# Patient Record
Sex: FEMALE | Race: WHITE | Employment: FULL TIME | URBAN - METROPOLITAN AREA
[De-identification: names, ages, dates, MRNs, and addresses within clinical notes are randomized per-mention and may not be internally consistent; named-entity substitution may affect disease eponyms.]

---

## 2020-08-19 ENCOUNTER — HOSPITAL ENCOUNTER (OUTPATIENT)
Facility: CLINIC | Age: 35
End: 2020-08-19
Payer: COMMERCIAL

## 2020-10-29 ENCOUNTER — OFFICE VISIT (OUTPATIENT)
Dept: SURGERY | Facility: CLINIC | Age: 35
End: 2020-10-29
Payer: COMMERCIAL

## 2020-10-29 VITALS
BODY MASS INDEX: 27 KG/M2 | WEIGHT: 168 LBS | DIASTOLIC BLOOD PRESSURE: 82 MMHG | HEART RATE: 80 BPM | SYSTOLIC BLOOD PRESSURE: 114 MMHG | RESPIRATION RATE: 12 BRPM | HEIGHT: 66 IN | OXYGEN SATURATION: 100 %

## 2020-10-29 DIAGNOSIS — K42.9 UMBILICAL HERNIA WITHOUT OBSTRUCTION AND WITHOUT GANGRENE: ICD-10-CM

## 2020-10-29 PROCEDURE — 99203 OFFICE O/P NEW LOW 30 MIN: CPT | Performed by: SURGERY

## 2020-10-29 SDOH — HEALTH STABILITY: MENTAL HEALTH: HOW OFTEN DO YOU HAVE 6 OR MORE DRINKS ON ONE OCCASION?: NOT ASKED

## 2020-10-29 SDOH — HEALTH STABILITY: MENTAL HEALTH: HOW MANY STANDARD DRINKS CONTAINING ALCOHOL DO YOU HAVE ON A TYPICAL DAY?: NOT ASKED

## 2020-10-29 SDOH — HEALTH STABILITY: MENTAL HEALTH: HOW OFTEN DO YOU HAVE A DRINK CONTAINING ALCOHOL?: NOT ASKED

## 2020-10-29 ASSESSMENT — MIFFLIN-ST. JEOR: SCORE: 1460.85

## 2020-10-29 NOTE — LETTER
"2020       Re: Aisha Cantu - 1985    Surgery Consultation, [facility]         Bandar Boyd MD, MD     Aisha Cantu MRN# [id number]   YOB: 1985 Age: 35 year old      PCP:  Elif Porter [phone]     Chief Complaint:  Umbilical hernia     History of Present Illness:  Aisha Cantu is a 35 year old female who presented with a bulge near the umbilicus. The bulge comes and goes but has gotten larger over time. It is uncomfortable when the patient is engaging in exertional activity.  She first noticed it when she was pregnant with her second child, whom she delivered 6 weeks ago.  She has had relative improvement in her abdominal wall musculature and has no significant pain.  She has noticed a diastases after delivering her child but this is slowly improving.  No signs or symptoms of bowel obstruction.  The patient is here to discuss possible surgical repair of the umbilical hernia.     PMH:  Aisha Cantu  has no past medical history on file.  PSH:  Aisha Cantu  has no past surgical history on file.     Home medications and allergies reviewed.     Social History:  Aisha Cantu  reports that she has never smoked. She has never used smokeless tobacco. She reports current alcohol use.  Family History:  Aisha Cantu family history includes Breast Cancer in her paternal grandmother.     ROS:  The 10 point Review of Systems is negative other than noted in the HPI.     Physical Exam:  Blood pressure 114/82, pulse 80, resp. rate 12, height 1.664 m (5' 5.5\"), weight 76.2 kg (168 lb), SpO2 100 %.  168 lbs 0 oz  Healthy-appearing female in no distress.  Pleasant affect, answers questions appropriately    Pupils equal round and reactive to light.   No cervical lymphadenopathy or thyromegaly.   Lung fields clear, breathing comfortably.   Heart normal sinus rhythm.  No murmurs rubs or gallops.  Abdomen soft, nontender, nondistended.  3 cm rectus diastases primarily above " the umbilicus.  Easily palpable 2 to 3 cm umbilical hernia. Fascia around the area is attenuated consistent with her rectus diastases.  Hernia is easily reducible.  No surrounding surgical scars.  Assessment/plan:  Pleasant healthy young female with what appears to be an umbilical hernia. I explained that these are usually not a cause for small bowel incarceration or obstruction, but do become larger over time. They can certainly be uncomfortable and repair is warranted.  Patient does not plan on having more kids but has not had surgical sterilization.  Patient is hopeful to avoid surgery at this time, but understands that this may be an inevitability.  She will come back to see me in 6 to 12 months when she is interested in further evaluation and possible repair.  I recommended an open umbilical hernia repair with mesh. This would normally be done with IV sedation and local. Risks of surgery were explained to the patient, including hernia recurrence, wound infection, or mesh removal. Patient was going to notify the office when they were ready to schedule surgery.     Tarun Boyd M.D.  [facility]  [phone]

## 2020-10-29 NOTE — PROGRESS NOTES
"Surgery Consultation, [facility]         Bandar Boyd MD, MD    Aisha Cantu MRN# [id number]   YOB: 1985 Age: 35 year old     PCP:  Elif Porter [phone]    Chief Complaint:  Umbilical hernia    History of Present Illness:  Aisha Cantu is a 35 year old female who presented with a bulge near the umbilicus. The bulge comes and goes but has gotten larger over time. It is uncomfortable when the patient is engaging in exertional activity.  She first noticed it when she was pregnant with her second child, whom she delivered 6 weeks ago.  She has had relative improvement in her abdominal wall musculature and has no significant pain.  She has noticed a diastases after delivering her child but this is slowly improving.  No signs or symptoms of bowel obstruction.  The patient is here to discuss possible surgical repair of the umbilical hernia.    PMH:  Aisha Cantu  has no past medical history on file.  PSH:  Aisha Cantu  has no past surgical history on file.    Home medications and allergies reviewed.    Social History:  Aisha Cantu  reports that she has never smoked. She has never used smokeless tobacco. She reports current alcohol use.  Family History:  Aisha Cantu family history includes Breast Cancer in her paternal grandmother.    ROS:  The 10 point Review of Systems is negative other than noted in the HPI.    Physical Exam:  Blood pressure 114/82, pulse 80, resp. rate 12, height 1.664 m (5' 5.5\"), weight 76.2 kg (168 lb), SpO2 100 %.  168 lbs 0 oz  Healthy-appearing female in no distress.  Pleasant affect, answers questions appropriately  Pupils equal round and reactive to light.   No cervical lymphadenopathy or thyromegaly.   Lung fields clear, breathing comfortably.   Heart normal sinus rhythm.  No murmurs rubs or gallops.  Abdomen soft, nontender, nondistended.  3 cm rectus diastases primarily above the umbilicus.  Easily palpable 2 to 3 cm umbilical hernia.  Fascia " around the area is attenuated consistent with her rectus diastases.  Hernia is easily reducible.  No surrounding surgical scars.       Assessment/plan:  Pleasant healthy young female with what appears to be an umbilical hernia. I explained that these are usually not a cause for small bowel incarceration or obstruction, but do become larger over time. They can certainly be uncomfortable and repair is warranted.  Patient does not plan on having more kids but has not had surgical sterilization.  Patient is hopeful to avoid surgery at this time, but understands that this may be an inevitability.  She will come back to see me in 6 to 12 months when she is interested in further evaluation and possible repair.  I recommended an open umbilical hernia repair with mesh. This would normally be done with IV sedation and local. Risks of surgery were explained to the patient, including hernia recurrence, wound infection, or mesh removal.  Patient was going to notify the office when they were ready to schedule surgery.    Tarun Boyd M.D.  [facility]  [phone]    Please route or send letter to:  Primary Care Provider (PCP) and Referring Provider